# Patient Record
Sex: MALE | Race: WHITE | Employment: UNEMPLOYED | URBAN - METROPOLITAN AREA
[De-identification: names, ages, dates, MRNs, and addresses within clinical notes are randomized per-mention and may not be internally consistent; named-entity substitution may affect disease eponyms.]

---

## 2020-08-27 ENCOUNTER — INITIAL CONSULT (OUTPATIENT)
Dept: SURGERY | Age: 65
End: 2020-08-27
Payer: COMMERCIAL

## 2020-08-27 VITALS — TEMPERATURE: 98.7 F | WEIGHT: 218 LBS | SYSTOLIC BLOOD PRESSURE: 130 MMHG | DIASTOLIC BLOOD PRESSURE: 74 MMHG

## 2020-08-27 PROCEDURE — 99242 OFF/OP CONSLTJ NEW/EST SF 20: CPT | Performed by: SURGERY

## 2020-08-27 RX ORDER — LISINOPRIL 20 MG/1
20 TABLET ORAL 2 TIMES DAILY
COMMUNITY

## 2020-08-27 RX ORDER — ATENOLOL 25 MG/1
TABLET ORAL
COMMUNITY
Start: 2020-08-06

## 2020-08-27 RX ORDER — SODIUM CHLORIDE 0.9 % (FLUSH) 0.9 %
10 SYRINGE (ML) INJECTION EVERY 12 HOURS SCHEDULED
Status: CANCELLED | OUTPATIENT
Start: 2020-08-27

## 2020-08-27 RX ORDER — HEPARIN SODIUM 5000 [USP'U]/ML
5000 INJECTION, SOLUTION INTRAVENOUS; SUBCUTANEOUS ONCE
Status: CANCELLED | OUTPATIENT
Start: 2020-08-27

## 2020-08-27 RX ORDER — SODIUM CHLORIDE 0.9 % (FLUSH) 0.9 %
10 SYRINGE (ML) INJECTION PRN
Status: CANCELLED | OUTPATIENT
Start: 2020-08-27

## 2020-08-27 SDOH — HEALTH STABILITY: MENTAL HEALTH: HOW OFTEN DO YOU HAVE A DRINK CONTAINING ALCOHOL?: NEVER

## 2020-08-27 NOTE — PROGRESS NOTES
New Patient Via Casey Ha MD    [address], [address] [place] [address]  [place], [facility]  [phone]    Abel Beckett   YOB: 1955    Date of Visit:  1/59/8043      JASON Ayala - CNP    Chief Complaint: Abdominal bulge    HPI: Patient presents for evaluation of a bulge in his upper abdomen. He states he has had this for a few months, but it seems to be getting worse. It is larger after lifting at work. He denies nausea or vomiting. His bowels have been working normally    No Known Allergies  Outpatient Medications Marked as Taking for the 8/27/20 encounter (Initial consult) with Nirav Christopher MD   Medication Sig Dispense Refill    lisinopril (PRINIVIL;ZESTRIL) 20 MG tablet Take 20 mg by mouth 2 times daily      atenolol (TENORMIN) 25 MG tablet TAKE 1 TABLET BY MOUTH ONCE DAILY AT NIGHT         Past Medical History:   Diagnosis Date    Hypertension      History reviewed. No pertinent surgical history. History reviewed. No pertinent family history.   Social History     Socioeconomic History    Marital status: Unknown     Spouse name: Not on file    Number of children: Not on file    Years of education: Not on file    Highest education level: Not on file   Occupational History    Not on file   Social Needs    Financial resource strain: Not on file    Food insecurity     Worry: Not on file     Inability: Not on file    Transportation needs     Medical: Not on file     Non-medical: Not on file   Tobacco Use    Smoking status: Never Smoker    Smokeless tobacco: Never Used   Substance and Sexual Activity    Alcohol use: Never     Frequency: Never    Drug use: Never    Sexual activity: Not on file   Lifestyle    Physical activity     Days per week: Not on file     Minutes per session: Not on file    Stress: Not on file   Relationships    Social connections     Talks on phone: Not on file     Gets together: present and adequate No

## 2020-09-11 ENCOUNTER — TELEPHONE (OUTPATIENT)
Dept: SURGERY | Age: 65
End: 2020-09-11

## 2020-09-11 NOTE — TELEPHONE ENCOUNTER
Patient called in stating that he has an abscess on his back , he states that he saw Gerardo Machuca today in his office and his abscess was drained. His significant other got on the phone and explained a little further that they are not happy with the care they have received there. They would like to make an appointment to have it looked at here. I let them know that normally once you have a surgeon they would need to stick with them but I would ask. Please advise.

## 2020-09-15 ENCOUNTER — OFFICE VISIT (OUTPATIENT)
Dept: SURGERY | Age: 65
End: 2020-09-15
Payer: COMMERCIAL

## 2020-09-15 VITALS
SYSTOLIC BLOOD PRESSURE: 136 MMHG | BODY MASS INDEX: 31.44 KG/M2 | WEIGHT: 219.6 LBS | HEIGHT: 70 IN | TEMPERATURE: 97.1 F | DIASTOLIC BLOOD PRESSURE: 86 MMHG

## 2020-09-15 PROCEDURE — 99213 OFFICE O/P EST LOW 20 MIN: CPT | Performed by: SURGERY

## 2020-09-15 RX ORDER — DOXYCYCLINE 100 MG/1
CAPSULE ORAL
COMMUNITY
Start: 2020-09-11

## 2020-09-15 NOTE — PROGRESS NOTES
[facility] SURGERY    CHIEF COMPLAINT: Infected sebaceous cyst    SUBJECTIVE:   Patient presents for evaluation of an infected sebaceous cyst on his back. He reports that it flared up last week. He was seen in an outlying facility and had incision and drainage. He is concerned about the adequacy of the treatment and wanted me to look at it. He initially was not treated with antibiotics by the doctor who drained it, but subsequently went to urgent care and was given a course of antibiotics. He states it is getting better. It had significant redness around it that has since resolved. It is still draining. No Known Allergies  Outpatient Medications Marked as Taking for the 9/15/20 encounter (Office Visit) with Mina Laurent MD   Medication Sig Dispense Refill    lisinopril (PRINIVIL;ZESTRIL) 20 MG tablet Take 20 mg by mouth 2 times daily      atenolol (TENORMIN) 25 MG tablet TAKE 1 TABLET BY MOUTH ONCE DAILY AT NIGHT         Past Medical History:   Diagnosis Date    Hypertension      History reviewed. No pertinent surgical history. History reviewed. No pertinent family history.   Social History     Socioeconomic History    Marital status: Unknown     Spouse name: Not on file    Number of children: Not on file    Years of education: Not on file    Highest education level: Not on file   Occupational History    Not on file   Social Needs    Financial resource strain: Not on file    Food insecurity     Worry: Not on file     Inability: Not on file    Transportation needs     Medical: Not on file     Non-medical: Not on file   Tobacco Use    Smoking status: Never Smoker    Smokeless tobacco: Never Used   Substance and Sexual Activity    Alcohol use: Never     Frequency: Never    Drug use: Never    Sexual activity: Not on file   Lifestyle    Physical activity     Days per week: Not on file     Minutes per session: Not on file    Stress: Not on file   Relationships    Social

## 2021-05-11 ENCOUNTER — HOSPITAL ENCOUNTER (OUTPATIENT)
Age: 66
Setting detail: SPECIMEN
Discharge: HOME | End: 2021-05-11
Payer: COMMERCIAL

## 2021-05-11 ENCOUNTER — OFFICE VISIT (OUTPATIENT)
Dept: SURGERY | Age: 66
End: 2021-05-11
Payer: COMMERCIAL

## 2021-05-11 VITALS
DIASTOLIC BLOOD PRESSURE: 74 MMHG | HEIGHT: 70 IN | BODY MASS INDEX: 33.07 KG/M2 | SYSTOLIC BLOOD PRESSURE: 130 MMHG | WEIGHT: 231 LBS

## 2021-05-11 DIAGNOSIS — L72.3 INFECTED SEBACEOUS CYST: Primary | ICD-10-CM

## 2021-05-11 DIAGNOSIS — L08.9 INFECTED SEBACEOUS CYST: Primary | ICD-10-CM

## 2021-05-11 PROCEDURE — 87205 SMEAR GRAM STAIN: CPT

## 2021-05-11 PROCEDURE — 87075 CULTR BACTERIA EXCEPT BLOOD: CPT

## 2021-05-11 PROCEDURE — 99999 PR OFFICE/OUTPT VISIT,PROCEDURE ONLY: CPT | Performed by: SURGERY

## 2021-05-11 PROCEDURE — 10061 I&D ABSCESS COMP/MULTIPLE: CPT | Performed by: SURGERY

## 2021-05-11 PROCEDURE — 87070 CULTURE OTHR SPECIMN AEROBIC: CPT

## 2021-05-11 RX ORDER — LOSARTAN POTASSIUM AND HYDROCHLOROTHIAZIDE 25; 100 MG/1; MG/1
TABLET ORAL
COMMUNITY
Start: 2021-04-27

## 2021-05-11 RX ORDER — CEPHALEXIN 500 MG/1
500 CAPSULE ORAL 3 TIMES DAILY
Qty: 21 CAPSULE | Refills: 0 | Status: SHIPPED | OUTPATIENT
Start: 2021-05-11 | End: 2021-05-18

## 2021-05-11 NOTE — PROGRESS NOTES
on file   Relationships    Social connections     Talks on phone: Not on file     Gets together: Not on file     Attends Druze service: Not on file     Active member of club or organization: Not on file     Attends meetings of clubs or organizations: Not on file     Relationship status: Not on file    Intimate partner violence     Fear of current or ex partner: Not on file     Emotionally abused: Not on file     Physically abused: Not on file     Forced sexual activity: Not on file   Other Topics Concern    Not on file   Social History Narrative    Not on file          Vitals:    05/11/21 0841   BP: 130/74   Site: Left Upper Arm   Position: Sitting   Cuff Size: Large Adult   Weight: 231 lb (104.8 kg)   Height: 5' 10\" (1.778 m)     Body mass index is 33.15 kg/m². Wt Readings from Last 3 Encounters:   05/11/21 231 lb (104.8 kg)   09/15/20 219 lb 9.6 oz (99.6 kg)   08/27/20 218 lb (98.9 kg)     BP Readings from Last 3 Encounters:   05/11/21 130/74   09/15/20 136/86   08/27/20 130/74        REVIEW OF SYSTEMS:  CONSTITUTIONAL:  negative  HEENT:  Negative  RESPIRATORY:  negative  CARDIOVASCULAR:  negative  GASTROINTESTINAL:  negative  GENITOURINARY:  negative  HEMATOLOGIC/LYMPHATIC:  negative  ENDOCRINE:  Negative  NEUROLOGICAL:  Negative  * All other ROS reviewed and negative.      PE:  Constitutional:  Well developed, well nourished, no acute distress, non-toxic appearance   Eyes:  PERRL, conjunctiva normal   HENT:  Atraumatic, external ears normal, nose normal. Neck- normal range of motion, no tenderness, supple   Respiratory:  No respiratory distress, normal breath sounds, no rales, no wheezing   Cardiovascular:  Normal rate, normal rhythm  Integument: On his left mid back, he has a 3 cm area of erythema and induration with central fluctuance  Lymphatic:  No lymphadenopathy noted   Neurologic:  Alert & oriented x 3, no focal deficits noted   Psychiatric:  Speech and behavior appropriate       DATA:  Culture

## 2021-05-18 LAB
ANAEROBIC CULTURE: ABNORMAL
GRAM STAIN RESULT: ABNORMAL
WOUND/ABSCESS: ABNORMAL

## 2021-06-09 ENCOUNTER — TELEPHONE (OUTPATIENT)
Dept: SURGERY | Age: 66
End: 2021-06-09

## 2021-06-09 NOTE — TELEPHONE ENCOUNTER
Called pt to confirm appt with pt he stated he was healing fine and doing better and wanted to cancel his appt.

## 2023-04-25 ENCOUNTER — INITIAL CONSULT (OUTPATIENT)
Dept: SURGERY | Age: 68
End: 2023-04-25
Payer: MEDICARE

## 2023-04-25 VITALS
BODY MASS INDEX: 34.22 KG/M2 | SYSTOLIC BLOOD PRESSURE: 135 MMHG | HEART RATE: 90 BPM | WEIGHT: 239 LBS | DIASTOLIC BLOOD PRESSURE: 89 MMHG | HEIGHT: 70 IN

## 2023-04-25 DIAGNOSIS — K43.6 INCARCERATED EPIGASTRIC HERNIA: Primary | ICD-10-CM

## 2023-04-25 PROCEDURE — 99214 OFFICE O/P EST MOD 30 MIN: CPT | Performed by: SURGERY

## 2023-04-25 PROCEDURE — 1123F ACP DISCUSS/DSCN MKR DOCD: CPT | Performed by: SURGERY

## 2023-04-25 RX ORDER — HEPARIN SODIUM 5000 [USP'U]/ML
5000 INJECTION, SOLUTION INTRAVENOUS; SUBCUTANEOUS ONCE
OUTPATIENT
Start: 2023-04-25 | End: 2023-04-25

## 2023-04-25 RX ORDER — SODIUM CHLORIDE 0.9 % (FLUSH) 0.9 %
5-40 SYRINGE (ML) INJECTION EVERY 12 HOURS SCHEDULED
OUTPATIENT
Start: 2023-04-25

## 2023-04-25 RX ORDER — M-VIT,TX,IRON,MINS/CALC/FOLIC 27MG-0.4MG
1 TABLET ORAL DAILY
COMMUNITY

## 2023-04-25 RX ORDER — SODIUM CHLORIDE 0.9 % (FLUSH) 0.9 %
5-40 SYRINGE (ML) INJECTION PRN
OUTPATIENT
Start: 2023-04-25

## 2023-04-25 RX ORDER — SODIUM CHLORIDE 9 MG/ML
INJECTION, SOLUTION INTRAVENOUS PRN
OUTPATIENT
Start: 2023-04-25

## 2023-04-25 RX ORDER — ASCORBIC ACID 1000 MG
TABLET ORAL
COMMUNITY

## 2023-04-25 NOTE — PROGRESS NOTES
[facility] SURGERY    CHIEF COMPLAINT: Abdominal bulge    SUBJECTIVE:   Patient presents for follow up of his epigastric hernia. He reports he is getting larger and more uncomfortable. It still not go back in. He has discomfort if he lifts or strains. He denies nausea or vomiting. His bowels are working normally. Allergies   Allergen Reactions    Penicillin G Nausea And Vomiting     Outpatient Medications Marked as Taking for the 4/25/23 encounter (Initial consult) with Leovn Badillo MD   Medication Sig Dispense Refill    Multiple Vitamins-Minerals (THERAPEUTIC MULTIVITAMIN-MINERALS) tablet Take 1 tablet by mouth daily      Ascorbic Acid (VITAMIN C PO) Take by mouth      Cholecalciferol (VITAMIN D-3 PO) Take by mouth      Ginkgo Biloba 40 MG TABS Take by mouth      losartan-hydroCHLOROthiazide (HYZAAR) 100-25 MG per tablet TAKE 1 2 (ONE HALF) TABLET BY MOUTH ONCE DAILY         Past Medical History:   Diagnosis Date    Hypertension      Past Surgical History:   Procedure Laterality Date    LITHOTRIPSY       No family history on file.   Social History     Socioeconomic History    Marital status: Unknown     Spouse name: Not on file    Number of children: Not on file    Years of education: Not on file    Highest education level: Not on file   Occupational History    Not on file   Tobacco Use    Smoking status: Never     Passive exposure: Never    Smokeless tobacco: Never   Vaping Use    Vaping Use: Never used   Substance and Sexual Activity    Alcohol use: Never    Drug use: Never    Sexual activity: Not on file   Other Topics Concern    Not on file   Social History Narrative    Not on file     Social Determinants of Health     Financial Resource Strain: Not on file   Food Insecurity: Not on file   Transportation Needs: Not on file   Physical Activity: Not on file   Stress: Not on file   Social Connections: Not on file   Intimate Partner Violence: Not on file   Housing Stability: Not on file

## 2023-06-13 ENCOUNTER — HOSPITAL ENCOUNTER (OUTPATIENT)
Age: 68
Setting detail: OUTPATIENT SURGERY
Discharge: HOME | End: 2023-06-13
Attending: SURGERY | Admitting: SURGERY
Payer: MEDICARE

## 2023-06-13 VITALS
TEMPERATURE: 97.3 F | HEIGHT: 70 IN | SYSTOLIC BLOOD PRESSURE: 135 MMHG | HEART RATE: 80 BPM | BODY MASS INDEX: 32.93 KG/M2 | RESPIRATION RATE: 12 BRPM | OXYGEN SATURATION: 93 % | WEIGHT: 230 LBS | DIASTOLIC BLOOD PRESSURE: 82 MMHG

## 2023-06-13 DIAGNOSIS — K43.6 INCARCERATED EPIGASTRIC HERNIA: Primary | ICD-10-CM

## 2023-06-13 LAB
EKG ATRIAL RATE: 85 BPM
EKG DIAGNOSIS: NORMAL
EKG P AXIS: 62 DEGREES
EKG P-R INTERVAL: 172 MS
EKG Q-T INTERVAL: 388 MS
EKG QRS DURATION: 88 MS
EKG QTC CALCULATION (BAZETT): 461 MS
EKG R AXIS: -21 DEGREES
EKG T AXIS: 59 DEGREES
EKG VENTRICULAR RATE: 85 BPM

## 2023-06-13 PROCEDURE — 6370000000 HC RX 637 (ALT 250 FOR IP): Performed by: ANESTHESIOLOGY

## 2023-06-13 PROCEDURE — 3700000000 HC ANESTHESIA ATTENDED CARE: Performed by: SURGERY

## 2023-06-13 PROCEDURE — 2500000003 HC RX 250 WO HCPCS: Performed by: SURGERY

## 2023-06-13 PROCEDURE — 2500000003 HC RX 250 WO HCPCS: Performed by: ANESTHESIOLOGY

## 2023-06-13 PROCEDURE — 93005 ELECTROCARDIOGRAM TRACING: CPT | Performed by: ANESTHESIOLOGY

## 2023-06-13 PROCEDURE — 7100000011 HC PHASE II RECOVERY - ADDTL 15 MIN: Performed by: SURGERY

## 2023-06-13 PROCEDURE — 49594 RPR AA HRN 1ST 3-10 NCR/STRN: CPT | Performed by: SURGERY

## 2023-06-13 PROCEDURE — C1781 MESH (IMPLANTABLE): HCPCS | Performed by: SURGERY

## 2023-06-13 PROCEDURE — 3700000001 HC ADD 15 MINUTES (ANESTHESIA): Performed by: SURGERY

## 2023-06-13 PROCEDURE — 6360000002 HC RX W HCPCS: Performed by: SURGERY

## 2023-06-13 PROCEDURE — 3600000012 HC SURGERY LEVEL 2 ADDTL 15MIN: Performed by: SURGERY

## 2023-06-13 PROCEDURE — 7100000001 HC PACU RECOVERY - ADDTL 15 MIN: Performed by: SURGERY

## 2023-06-13 PROCEDURE — 2709999900 HC NON-CHARGEABLE SUPPLY: Performed by: SURGERY

## 2023-06-13 PROCEDURE — 7100000000 HC PACU RECOVERY - FIRST 15 MIN: Performed by: SURGERY

## 2023-06-13 PROCEDURE — 93010 ELECTROCARDIOGRAM REPORT: CPT | Performed by: INTERNAL MEDICINE

## 2023-06-13 PROCEDURE — 7100000010 HC PHASE II RECOVERY - FIRST 15 MIN: Performed by: SURGERY

## 2023-06-13 PROCEDURE — 3600000002 HC SURGERY LEVEL 2 BASE: Performed by: SURGERY

## 2023-06-13 DEVICE — PATCH HERN M W4.3XL5.5IN UNCOATED MFIL PROPYLENE OVL SELF: Type: IMPLANTABLE DEVICE | Site: ABDOMEN | Status: FUNCTIONAL

## 2023-06-13 RX ORDER — OXYCODONE HYDROCHLORIDE 5 MG/1
10 TABLET ORAL PRN
Status: COMPLETED | OUTPATIENT
Start: 2023-06-13 | End: 2023-06-13

## 2023-06-13 RX ORDER — ONDANSETRON 2 MG/ML
4 INJECTION INTRAMUSCULAR; INTRAVENOUS
Status: DISCONTINUED | OUTPATIENT
Start: 2023-06-13 | End: 2023-06-13 | Stop reason: HOSPADM

## 2023-06-13 RX ORDER — OXYCODONE HYDROCHLORIDE 5 MG/1
5 TABLET ORAL PRN
Status: COMPLETED | OUTPATIENT
Start: 2023-06-13 | End: 2023-06-13

## 2023-06-13 RX ORDER — OXYCODONE HYDROCHLORIDE AND ACETAMINOPHEN 5; 325 MG/1; MG/1
1 TABLET ORAL EVERY 6 HOURS PRN
Qty: 20 TABLET | Refills: 0 | Status: SHIPPED | OUTPATIENT
Start: 2023-06-13 | End: 2023-06-18

## 2023-06-13 RX ORDER — SODIUM CHLORIDE 0.9 % (FLUSH) 0.9 %
5-40 SYRINGE (ML) INJECTION EVERY 12 HOURS SCHEDULED
Status: DISCONTINUED | OUTPATIENT
Start: 2023-06-13 | End: 2023-06-13 | Stop reason: HOSPADM

## 2023-06-13 RX ORDER — MEPERIDINE HYDROCHLORIDE 25 MG/ML
12.5 INJECTION INTRAMUSCULAR; INTRAVENOUS; SUBCUTANEOUS EVERY 5 MIN PRN
Status: DISCONTINUED | OUTPATIENT
Start: 2023-06-13 | End: 2023-06-13 | Stop reason: HOSPADM

## 2023-06-13 RX ORDER — SODIUM CHLORIDE 9 MG/ML
INJECTION, SOLUTION INTRAVENOUS PRN
Status: DISCONTINUED | OUTPATIENT
Start: 2023-06-13 | End: 2023-06-13 | Stop reason: HOSPADM

## 2023-06-13 RX ORDER — SODIUM CHLORIDE 0.9 % (FLUSH) 0.9 %
5-40 SYRINGE (ML) INJECTION PRN
Status: DISCONTINUED | OUTPATIENT
Start: 2023-06-13 | End: 2023-06-13 | Stop reason: HOSPADM

## 2023-06-13 RX ORDER — CEFAZOLIN 2 G/1
INJECTION, POWDER, FOR SOLUTION INTRAMUSCULAR; INTRAVENOUS
Status: DISCONTINUED
Start: 2023-06-13 | End: 2023-06-13 | Stop reason: HOSPADM

## 2023-06-13 RX ORDER — FAMOTIDINE 10 MG/ML
20 INJECTION, SOLUTION INTRAVENOUS ONCE
Status: COMPLETED | OUTPATIENT
Start: 2023-06-13 | End: 2023-06-13

## 2023-06-13 RX ORDER — HEPARIN SODIUM 5000 [USP'U]/ML
5000 INJECTION, SOLUTION INTRAVENOUS; SUBCUTANEOUS ONCE
Status: COMPLETED | OUTPATIENT
Start: 2023-06-13 | End: 2023-06-13

## 2023-06-13 RX ORDER — SODIUM CHLORIDE, SODIUM LACTATE, POTASSIUM CHLORIDE, CALCIUM CHLORIDE 600; 310; 30; 20 MG/100ML; MG/100ML; MG/100ML; MG/100ML
INJECTION, SOLUTION INTRAVENOUS CONTINUOUS
Status: DISCONTINUED | OUTPATIENT
Start: 2023-06-13 | End: 2023-06-13 | Stop reason: HOSPADM

## 2023-06-13 RX ADMIN — HEPARIN SODIUM 5000 UNITS: 5000 INJECTION INTRAVENOUS; SUBCUTANEOUS at 11:49

## 2023-06-13 RX ADMIN — FAMOTIDINE 20 MG: 10 INJECTION, SOLUTION INTRAVENOUS at 11:48

## 2023-06-13 RX ADMIN — OXYCODONE HYDROCHLORIDE 5 MG: 5 TABLET ORAL at 14:22

## 2023-06-13 ASSESSMENT — PAIN SCALES - GENERAL
PAINLEVEL_OUTOF10: 5
PAINLEVEL_OUTOF10: 4

## 2023-06-13 ASSESSMENT — PAIN DESCRIPTION - PAIN TYPE
TYPE: SURGICAL PAIN
TYPE: SURGICAL PAIN

## 2023-06-13 ASSESSMENT — PAIN DESCRIPTION - LOCATION
LOCATION: ABDOMEN
LOCATION: ABDOMEN

## 2023-06-13 ASSESSMENT — PAIN DESCRIPTION - DESCRIPTORS
DESCRIPTORS: DISCOMFORT
DESCRIPTORS: DISCOMFORT

## 2023-06-13 ASSESSMENT — PAIN - FUNCTIONAL ASSESSMENT: PAIN_FUNCTIONAL_ASSESSMENT: NONE - DENIES PAIN

## 2023-06-13 NOTE — PROGRESS NOTES
Discharge instructions given to patient and patients wife. Both deny any questions at this time. Marysol Corrales RN

## 2023-06-13 NOTE — BRIEF OP NOTE
Brief Postoperative Note      Patient: Moisés Mayfield  YOB: 1955  MRN: [id number]    Date of Procedure: 6/13/2023    Pre-Op Diagnosis Codes:     * Incarcerated epigastric hernia [K43.6]    Post-Op Diagnosis: Same       Procedure(s):  INCARERATED EPIGASTRIC HERNIA REPAIR WITH MESH     Surgeon(s):  Sangita Martinez MD    Assistant:  Surgical Assistant: Pretty Ro    Anesthesia: General    Estimated Blood Loss (mL): less than 50     Complications: None    Specimens:   * No specimens in log *    Implants:  Implant Name Type Inv.  Item Serial No.  Lot No. LRB No. Used Action   PATCH [facility] W4.3XL5.5IN UNCOATED MFIL PROPYLENE OVL SELF - [id number]  [address] [facility] W4.3XL5.5IN UNCOATED MFIL PROPYLENE OVL SELF  BARD DAVOL- [id number] N/A 1 Implanted         Drains: * No LDAs found *    Findings: as above      Electronically signed by Willa Bolton MD on 6/13/2023 at 1:32 PM

## 2023-06-13 NOTE — PROGRESS NOTES
Patient discharged via wheelchair in stable condition with all belongings to private car. Gregory Knapp RN

## 2023-06-13 NOTE — H&P
[facility] SURGERY     6/13/2023    CHIEF COMPLAINT: Abdominal bulge     SUBJECTIVE:             Patient presents for follow up of his epigastric hernia. He reports he is getting larger and more uncomfortable. It still not go back in. He has discomfort if he lifts or strains. He denies nausea or vomiting. His bowels are working normally. Allergies   Allergen Reactions    Penicillin G Nausea And Vomiting      Active Medications          Outpatient Medications Marked as Taking for the 4/25/23 encounter (Initial consult) with Deonte Villaseñor MD   Medication Sig Dispense Refill    Multiple Vitamins-Minerals (THERAPEUTIC MULTIVITAMIN-MINERALS) tablet Take 1 tablet by mouth daily        Ascorbic Acid (VITAMIN C PO) Take by mouth        Cholecalciferol (VITAMIN D-3 PO) Take by mouth        Ginkgo Biloba 40 MG TABS Take by mouth        losartan-hydroCHLOROthiazide (HYZAAR) 100-25 MG per tablet TAKE 1 2 (ONE HALF) TABLET BY MOUTH ONCE DAILY                Past Medical History        Past Medical History:   Diagnosis Date    Hypertension           Past Surgical History         Past Surgical History:   Procedure Laterality Date    LITHOTRIPSY             Family History   No family history on file.      Social History               Socioeconomic History    Marital status: Unknown       Spouse name: Not on file    Number of children: Not on file    Years of education: Not on file    Highest education level: Not on file   Occupational History    Not on file   Tobacco Use    Smoking status: Never       Passive exposure: Never    Smokeless tobacco: Never   Vaping Use    Vaping Use: Never used   Substance and Sexual Activity    Alcohol use: Never    Drug use: Never    Sexual activity: Not on file   Other Topics Concern    Not on file   Social History Narrative    Not on file      Social Determinants of Health      Financial Resource Strain: Not on file   Food Insecurity: Not on file   Transportation Needs: Not

## 2023-06-13 NOTE — DISCHARGE INSTRUCTIONS
[facility] SURGERY [facility] AND [facility]. Nikkie Conklin M.D. [address] [place] [address] [place]                [address] Kim Harris M.D. [address] [place] [address]         [place], [facility] Stephanie Bennett M.D                         [phone]        [facility] Cesar Gonzalez M.D. [address] [place] [address]         POST-OPERATIVE INSTRUCTIONS AND DRAIN CARE FOLLOWING YOUR VENTRAL HERNIA REPAIR    Call the office to schedule your post-operative appointment with your surgeon for two (2) weeks. If you still have bandages over your incisions, you may remove them in 2-3 days. You will have pain medicine ordered. Take as directed. Wear your abdominal binder at all times until seen by your surgeon. General guidelines for activity:    Avoid strenuous activity or lifting anything heavier than 15 pounds. It is OK to be  walking around; going up and down stairs is also OK. Do what is comfortable: stop and rest when you feel tired. Do NOT drive for one week and while taking your narcotic pain medicine. You may shower starting Friday     Watch for signs of infection:     Excessive warmth or bright redness around your incision    Leakage of cloudy fluid from you incisions    Fever over 101.5    If you experience constipation  Increase your water intake and activity; walking is best.  A stool softener or mild laxative may be necessary if you still have not had a bowel  movement ; call the office for further instructions. Please take note: IF you do not take all of your narcotic pain medication, we ask that you dispose of these responsibly. Drug drop off boxes are located in the [facility] and [address] [place] [address] emergency departments.    These [facility] Safe return cabinets are

## 2023-06-14 NOTE — OP NOTE
[address]                 [address] [address]                                OPERATIVE REPORT    PATIENT NAME: Diego Frazier                      :        1955  MED REC NO:   [id number]                          ROOM:  ACCOUNT NO:   [de-identified]                           ADMIT DATE: 2023  PROVIDER:     Alejandro Darby MD    DATE OF PROCEDURE:  2023    PREOPERATIVE DIAGNOSIS:  Incarcerated epigastric hernia. POSTOPERATIVE DIAGNOSIS:  Incarcerated epigastric hernia. PROCEDURE PERFORMED:  Incarcerated epigastric hernia repair with mesh. ANESTHESIA:  General.    SURGEON:  Alejandro Mena. MD Mehnaz    ESTIMATED BLOOD LOSS:  Less than 50 mL. INDICATIONS:  The patient is a 57-year-old gentleman who presented with  abdominal pain and was found to have an incarcerated epigastric hernia. OPERATIVE SUMMARY:  After preoperative evaluation, the patient was  brought in the operating suite and placed in a comfortable supine  position on the operating room table. Monitoring equipment was attached  and general anesthesia was induced. His abdomen was sterilely prepped  and draped and anesthetized with local anesthetic. An incision was made  over the hernia and dissected down to the hernia sac. The hernia sac  was encircled and dissected free of the surrounding fascia. It was  reduced, and the preperitoneal space was enlarged for several  centimeters around the defect. The defect measured 6 cm in diameter. An 11 x 14 mesh was obtained and placed in the preperitoneal space. It  filled the space nicely and was secured to the fascia at four points  around the periphery with sutures of #1 Prolene. The defect was then  closed primarily, incorporating the anterior surface of the mesh in the  closure.   The wound was irrigated and injected with more Marcaine, and  the incision closed with interrupted subcutaneous sutures of 3-0 Vicryl  and a

## 2023-06-30 PROBLEM — I10 ESSENTIAL HYPERTENSION: Status: ACTIVE | Noted: 2023-01-12

## 2023-06-30 PROBLEM — N20.1 RIGHT URETERAL STONE: Status: ACTIVE | Noted: 2023-01-01

## 2023-06-30 PROBLEM — N10 ACUTE PYELONEPHRITIS: Status: ACTIVE | Noted: 2023-01-10

## 2023-06-30 PROBLEM — I47.20 VENTRICULAR TACHYCARDIA (HCC): Status: ACTIVE | Noted: 2023-01-12

## 2023-06-30 PROBLEM — E78.2 MIXED HYPERLIPIDEMIA: Status: ACTIVE | Noted: 2023-01-12

## 2023-06-30 RX ORDER — METOPROLOL SUCCINATE 25 MG/1
25 TABLET, EXTENDED RELEASE ORAL DAILY
Qty: 30 TABLET | Refills: 11 | COMMUNITY
Start: 2023-02-08 | End: 2024-02-08

## 2023-06-30 RX ORDER — HEPARIN SODIUM (PORCINE) LOCK FLUSH IV SOLN 100 UNIT/ML 100 UNIT/ML
SOLUTION INTRAVENOUS
COMMUNITY
Start: 2023-01-13

## 2023-06-30 RX ORDER — OMEGA-3/DHA/EPA/FISH OIL 300-1000MG
1 CAPSULE ORAL 2 TIMES DAILY
COMMUNITY

## 2023-06-30 RX ORDER — DIPHENHYDRAMINE HCL 50 MG
50 CAPSULE ORAL NIGHTLY PRN
COMMUNITY

## 2023-06-30 RX ORDER — MAGNESIUM OXIDE 400 MG/1
400 TABLET ORAL NIGHTLY
COMMUNITY

## 2023-07-06 ENCOUNTER — OFFICE VISIT (OUTPATIENT)
Dept: SURGERY | Age: 68
End: 2023-07-06
Payer: MEDICARE

## 2023-07-06 VITALS
SYSTOLIC BLOOD PRESSURE: 140 MMHG | DIASTOLIC BLOOD PRESSURE: 80 MMHG | HEART RATE: 82 BPM | BODY MASS INDEX: 33.47 KG/M2 | HEIGHT: 70 IN | WEIGHT: 233.8 LBS

## 2023-07-06 DIAGNOSIS — K43.6 INCARCERATED EPIGASTRIC HERNIA: Primary | ICD-10-CM

## 2023-07-06 PROCEDURE — 3017F COLORECTAL CA SCREEN DOC REV: CPT | Performed by: SURGERY

## 2023-07-06 PROCEDURE — 3077F SYST BP >= 140 MM HG: CPT | Performed by: SURGERY

## 2023-07-06 PROCEDURE — 1036F TOBACCO NON-USER: CPT | Performed by: SURGERY

## 2023-07-06 PROCEDURE — G8417 CALC BMI ABV UP PARAM F/U: HCPCS | Performed by: SURGERY

## 2023-07-06 PROCEDURE — 1123F ACP DISCUSS/DSCN MKR DOCD: CPT | Performed by: SURGERY

## 2023-07-06 PROCEDURE — 99212 OFFICE O/P EST SF 10 MIN: CPT | Performed by: SURGERY

## 2023-07-06 PROCEDURE — 3079F DIAST BP 80-89 MM HG: CPT | Performed by: SURGERY

## 2023-07-06 PROCEDURE — G8427 DOCREV CUR MEDS BY ELIG CLIN: HCPCS | Performed by: SURGERY

## 2023-07-06 NOTE — PROGRESS NOTES
[facility] SURGERY    CHIEF COMPLAINT: Hernia repair    SUBJECTIVE:   Patient presents for follow up of his epigastric hernia repair. He reports doing well. He is having minimal discomfort. He is getting up and around okay. .    Allergies   Allergen Reactions    Penicillin G Nausea And Vomiting     Outpatient Medications Marked as Taking for the 7/6/23 encounter (Office Visit) with Patricia Devries MD   Medication Sig Dispense Refill    ASHWAGANDHA PO Take 2 tablets by mouth every morning      diphenhydrAMINE (BENADRYL) 50 MG capsule Take 1 capsule by mouth nightly as needed      heparin 100 UNIT/ML injection For Open Ended Midline/PICC/CVC/Port: Flush with 5ml heparin as final flush after each use, weekly if not used. Use 3ml for Peripheral IV .      magnesium oxide (MAG-OX) 400 MG tablet Take 1 tablet by mouth nightly      metoprolol succinate (TOPROL XL) 25 MG extended release tablet Take 1 tablet by mouth daily 30 tablet 11    sodium chloride 0.9 % SOLN 100 mL with alteplase 2 MG SOLR Instill 2mL into clotted IV line as needed for . May repeat as needed. Do not use on Peripheral or Midlines . B Complex Vitamins (B COMPLEX 1 PO) Take 1 tablet by mouth 2 times daily      fish oil-omega-3 fatty acids 1000 MG capsule Take 1 capsule by mouth 2 times daily      sodium chloride 4 MEQ/ML SOLN 34.188 mEq, sterile water SOLN 991.453 mL Midline/PICC/CVC/Port: Flush with 10ml pre/post use, weekly if not used, 20ml after lab draw/TPN. Use 5ml for Peripheral IV pre/post use .       Menaquinone-7 (VITAMIN K2 PO) Take 1 tablet by mouth daily      Multiple Vitamins-Minerals (THERAPEUTIC MULTIVITAMIN-MINERALS) tablet Take 1 tablet by mouth daily      Ascorbic Acid (VITAMIN C PO) Take by mouth      Cholecalciferol (VITAMIN D-3 PO) Take by mouth      Ginkgo Biloba 40 MG TABS Take by mouth      losartan-hydroCHLOROthiazide (HYZAAR) 100-25 MG per tablet TAKE 1 2 (ONE HALF) TABLET BY MOUTH ONCE DAILY

## 2023-07-21 ENCOUNTER — OFFICE VISIT (OUTPATIENT)
Dept: ORTHOPEDIC SURGERY | Age: 68
End: 2023-07-21

## 2023-07-21 DIAGNOSIS — M25.561 PAIN IN BOTH KNEES, UNSPECIFIED CHRONICITY: Primary | ICD-10-CM

## 2023-07-21 DIAGNOSIS — M25.562 PAIN IN BOTH KNEES, UNSPECIFIED CHRONICITY: Primary | ICD-10-CM

## 2023-07-21 DIAGNOSIS — M17.0 PRIMARY OSTEOARTHRITIS OF BOTH KNEES: ICD-10-CM

## 2023-07-21 RX ORDER — HYALURONATE SODIUM 10 MG/ML
40 SYRINGE (ML) INTRAARTICULAR ONCE
Status: COMPLETED | OUTPATIENT
Start: 2023-07-21 | End: 2023-07-21

## 2023-07-21 RX ADMIN — Medication 40 MG: at 15:08

## 2023-07-21 NOTE — PROGRESS NOTES
Date:  2023    Name:  Meng Kay  Address:  [address] [place] [address] [address] [facility] [address]    :  1955      Age:   79 y.o.    SSN:  [id number]      Medical Record Number:  [id number]    Reason for Visit:    Chief Complaint    Knee Pain (NP DANO KNEE)      DOS:2023     HPI: Meng Kay is a 79 y.o. male here today for new patient evaluation regarding bilateral knee pain right worse than left. The patient is a retired farmer. The patient reports that he was diagnosed with rheumatoid arthritis when he was a kid but he has been doing well treated for this. He lived an active lifestyle. Over the past few years he has had worsening pain to both of his knees. He reports some popping and clicking with some grinding. He has not had any treatments for his knees up to this point. ROS: All systems reviewed on patient intake form. Pertinent items are noted in HPI. Past Medical History:   Diagnosis Date    Hypertension         Past Surgical History:   Procedure Laterality Date    LITHOTRIPSY      VENTRAL HERNIA REPAIR N/A 2023    INCARERATED EPIGASTRIC HERNIA REPAIR WITH MESH  performed by Brian Small MD at [facility] OR       No family history on file. Social History     Socioeconomic History    Marital status: Unknown   Tobacco Use    Smoking status: Never     Passive exposure: Never    Smokeless tobacco: Never   Vaping Use    Vaping Use: Never used   Substance and Sexual Activity    Alcohol use: Never    Drug use: Never       Current Outpatient Medications   Medication Sig Dispense Refill    ASHWAGANDHA PO Take 2 tablets by mouth every morning      diphenhydrAMINE (BENADRYL) 50 MG capsule Take 1 capsule by mouth nightly as needed      heparin 100 UNIT/ML injection For Open Ended Midline/PICC/CVC/Port: Flush with 5ml heparin as final flush after each use, weekly if not used.   Use 3ml for Peripheral IV .      magnesium oxide (MAG-OX) 400 MG tablet Take 1 tablet by mouth nightly

## 2023-07-28 ENCOUNTER — OFFICE VISIT (OUTPATIENT)
Dept: ORTHOPEDIC SURGERY | Age: 68
End: 2023-07-28

## 2023-07-28 DIAGNOSIS — M17.0 PRIMARY OSTEOARTHRITIS OF BOTH KNEES: ICD-10-CM

## 2023-07-28 DIAGNOSIS — M25.562 PAIN IN BOTH KNEES, UNSPECIFIED CHRONICITY: Primary | ICD-10-CM

## 2023-07-28 DIAGNOSIS — M25.561 PAIN IN BOTH KNEES, UNSPECIFIED CHRONICITY: Primary | ICD-10-CM

## 2023-07-28 RX ORDER — HYALURONATE SODIUM 10 MG/ML
40 SYRINGE (ML) INTRAARTICULAR ONCE
Status: COMPLETED | OUTPATIENT
Start: 2023-07-28 | End: 2023-07-28

## 2023-07-28 RX ADMIN — Medication 40 MG: at 13:29

## 2023-08-04 ENCOUNTER — OFFICE VISIT (OUTPATIENT)
Dept: ORTHOPEDIC SURGERY | Age: 68
End: 2023-08-04

## 2023-08-04 DIAGNOSIS — M17.0 PRIMARY OSTEOARTHRITIS OF BOTH KNEES: Primary | ICD-10-CM

## 2023-08-04 RX ORDER — HYALURONATE SODIUM 10 MG/ML
40 SYRINGE (ML) INTRAARTICULAR ONCE
Status: COMPLETED | OUTPATIENT
Start: 2023-08-04 | End: 2023-08-04

## 2023-08-04 RX ADMIN — Medication 40 MG: at 11:16

## 2023-08-14 ENCOUNTER — TELEPHONE (OUTPATIENT)
Dept: ORTHOPEDIC SURGERY | Age: 68
End: 2023-08-14

## 2023-08-14 NOTE — TELEPHONE ENCOUNTER
General Question     Subject: POST INJECTION R KNEE SORE/WARM TO TOUCH WORSE THAN BEFORE - L IS FINE   Patient and /or Facility Request: Tiffanyflorinda Brandemily  Contact Number: [phone]     Pt EXPERIENCING WORSE PAIN THAN BEFORE GEL INJECTION, MEHTA TO TOUCH. Pt HAS TO ICE KNEE BEFORE HE CAN GET IT TO WORK, AND HAS TO SLEEP IN A CHAIR TO GET ANY REST. PLEASE ADVISE ASAP AND IF Pt NEEDS TO BE SEEN, ANY OFFICE IS FINE.

## 2023-08-16 NOTE — TELEPHONE ENCOUNTER
Spoke with patient. He is doing better now. Told him to ice, elevate, and baby the knee for a little while as this is a normal reaction for some people. If patient runs into anymore problems, to give me a call.

## (undated) DEVICE — SOLUTION IV IRRIG 500ML 0.9% SODIUM CHL 2F7123

## (undated) DEVICE — SUTURE PROL SZ 1 L30IN NONABSORBABLE BLU L36MM CT-1 1/2 CIR 8425H

## (undated) DEVICE — GAUZE,SPONGE,4"X4",8PLY,STRL,LF,10/TRAY: Brand: MEDLINE

## (undated) DEVICE — MAJOR SET UP PK

## (undated) DEVICE — 3M™ STERI-STRIP™ COMPOUND BENZOIN TINCTURE 40 BAGS/CARTON 4 CARTONS/CASE C1544: Brand: 3M™ STERI-STRIP™

## (undated) DEVICE — SUTURE VCRL SZ 4-0 L18IN ABSRB UD L19MM PS-2 3/8 CIR PRIM J496H

## (undated) DEVICE — STRIP,CLOSURE,WOUND,MEDI-STRIP,1/2X4: Brand: MEDLINE

## (undated) DEVICE — APPLICATOR MEDICATED 26 CC SOLUTION HI LT ORNG CHLORAPREP

## (undated) DEVICE — SUTURE PROL SZ 0 L18IN NONABSORBABLE BLU L36MM CT-1 1/2 CIR C821G

## (undated) DEVICE — GOWN SIRUS NONREIN XL W/TWL: Brand: MEDLINE INDUSTRIES, INC.

## (undated) DEVICE — PACK,UNIVERSAL,SPLIT,II,AURORA: Brand: MEDLINE

## (undated) DEVICE — CANNULA NSL 13FT TUBE AD ETCO2 DIV SAMP M

## (undated) DEVICE — SYRINGE MED 10ML LUERLOCK TIP W/O SFTY DISP

## (undated) DEVICE — Z INACTIVE USE 2855096 SPONGE GZ W4XL4IN 8 PLY 100% COT

## (undated) DEVICE — GLOVE ORANGE PI 7 1/2   MSG9075

## (undated) DEVICE — BINDER ABD H10IN FOR 27 48IN HIP UNIV E SGL PNL CNTCT CLSR

## (undated) DEVICE — ELECTRODE PT RET AD L9FT HI MOIST COND ADH HYDRGEL CORDED